# Patient Record
Sex: FEMALE | URBAN - METROPOLITAN AREA
[De-identification: names, ages, dates, MRNs, and addresses within clinical notes are randomized per-mention and may not be internally consistent; named-entity substitution may affect disease eponyms.]

---

## 2017-09-29 ENCOUNTER — APPOINTMENT (RX ONLY)
Dept: URBAN - METROPOLITAN AREA CLINIC 24 | Facility: CLINIC | Age: 22
Setting detail: DERMATOLOGY
End: 2017-09-29

## 2017-09-29 DIAGNOSIS — L259 CONTACT DERMATITIS AND OTHER ECZEMA, UNSPECIFIED CAUSE: ICD-10-CM

## 2017-09-29 DIAGNOSIS — L21.8 OTHER SEBORRHEIC DERMATITIS: ICD-10-CM

## 2017-09-29 DIAGNOSIS — L81.0 POSTINFLAMMATORY HYPERPIGMENTATION: ICD-10-CM

## 2017-09-29 DIAGNOSIS — L67.8 OTHER HAIR COLOR AND HAIR SHAFT ABNORMALITIES: ICD-10-CM

## 2017-09-29 PROBLEM — L30.8 OTHER SPECIFIED DERMATITIS: Status: ACTIVE | Noted: 2017-09-29

## 2017-09-29 PROBLEM — L29.8 OTHER PRURITUS: Status: ACTIVE | Noted: 2017-09-29

## 2017-09-29 PROBLEM — L20.84 INTRINSIC (ALLERGIC) ECZEMA: Status: ACTIVE | Noted: 2017-09-29

## 2017-09-29 PROCEDURE — 99203 OFFICE O/P NEW LOW 30 MIN: CPT

## 2017-09-29 PROCEDURE — ? COUNSELING

## 2017-09-29 PROCEDURE — ? ORDER TESTS

## 2017-09-29 PROCEDURE — ? TREATMENT REGIMEN

## 2017-09-29 PROCEDURE — ? PRESCRIPTION

## 2017-09-29 RX ORDER — CLOBETASOL PROPIONATE 0.46 MG/ML
1 SOLUTION TOPICAL QD
Qty: 1 | Refills: 6 | Status: ERX | COMMUNITY
Start: 2017-09-29

## 2017-09-29 RX ORDER — ALCLOMETASONE DIPROPIONATE 0.5 MG/G
1 CREAM TOPICAL BID
Qty: 1 | Refills: 3 | Status: ERX | COMMUNITY
Start: 2017-09-29

## 2017-09-29 RX ADMIN — ALCLOMETASONE DIPROPIONATE 1: 0.5 CREAM TOPICAL at 00:00

## 2017-09-29 RX ADMIN — CLOBETASOL PROPIONATE 1: 0.46 SOLUTION TOPICAL at 00:00

## 2017-09-29 ASSESSMENT — LOCATION DETAILED DESCRIPTION DERM
LOCATION DETAILED: MID-OCCIPITAL SCALP
LOCATION DETAILED: POSTERIOR MID-PARIETAL SCALP
LOCATION DETAILED: RIGHT VENTRAL PROXIMAL FOREARM

## 2017-09-29 ASSESSMENT — SEVERITY ASSESSMENT: SEVERITY: MILD

## 2017-09-29 ASSESSMENT — LOCATION ZONE DERM
LOCATION ZONE: SCALP
LOCATION ZONE: ARM

## 2017-09-29 ASSESSMENT — LOCATION SIMPLE DESCRIPTION DERM
LOCATION SIMPLE: RIGHT FOREARM
LOCATION SIMPLE: POSTERIOR SCALP

## 2017-09-29 NOTE — HPI: HAIR OTHER
How Did This Hair Complaint Occur?: sudden in onset
How Severe Is It?: moderate
Additional History: The patient states that when she moved to [place] a few months ago her hair texture changed. The patient states she was diagnosed with cradle cap a few years ago and uses fluocinolone acetonide.

## 2017-09-29 NOTE — PROCEDURE: TREATMENT REGIMEN
Detail Level: Simple
Plan: -will check labs to rule of metabolic cause\\n-patient is currently pregnant; counseled her that pregnancy can contribute to changes in hair\\n-advised patient to avoid chemicals on the hair and harsh heat styling

## 2018-10-30 NOTE — PROCEDURE: MIPS QUALITY
----- Message from Rayna Rodríguez sent at 10/30/2018 11:08 AM CDT -----  Contact: self [phone]  Pt was returning phone, please call back at [phone]. Thanks angel.  
Detail Level: Detailed
Quality 431: Preventive Care And Screening: Unhealthy Alcohol Use - Screening: Patient screened for unhealthy alcohol use using a single question and scores less than 2 times per year